# Patient Record
Sex: MALE | Race: WHITE | HISPANIC OR LATINO | Employment: UNEMPLOYED | URBAN - METROPOLITAN AREA
[De-identification: names, ages, dates, MRNs, and addresses within clinical notes are randomized per-mention and may not be internally consistent; named-entity substitution may affect disease eponyms.]

---

## 2020-04-25 ENCOUNTER — HOSPITAL ENCOUNTER (EMERGENCY)
Facility: MEDICAL CENTER | Age: 50
End: 2020-04-26
Attending: EMERGENCY MEDICINE
Payer: MEDICAID

## 2020-04-25 ENCOUNTER — APPOINTMENT (OUTPATIENT)
Dept: RADIOLOGY | Facility: MEDICAL CENTER | Age: 50
End: 2020-04-25
Attending: EMERGENCY MEDICINE
Payer: MEDICAID

## 2020-04-25 DIAGNOSIS — M54.16 BILATERAL LUMBAR RADICULOPATHY: ICD-10-CM

## 2020-04-25 PROCEDURE — 96372 THER/PROPH/DIAG INJ SC/IM: CPT

## 2020-04-25 PROCEDURE — 700111 HCHG RX REV CODE 636 W/ 250 OVERRIDE (IP): Performed by: EMERGENCY MEDICINE

## 2020-04-25 PROCEDURE — 700102 HCHG RX REV CODE 250 W/ 637 OVERRIDE(OP): Performed by: EMERGENCY MEDICINE

## 2020-04-25 PROCEDURE — A9270 NON-COVERED ITEM OR SERVICE: HCPCS | Performed by: EMERGENCY MEDICINE

## 2020-04-25 PROCEDURE — 99284 EMERGENCY DEPT VISIT MOD MDM: CPT

## 2020-04-25 PROCEDURE — 72131 CT LUMBAR SPINE W/O DYE: CPT

## 2020-04-25 RX ORDER — KETOROLAC TROMETHAMINE 30 MG/ML
30 INJECTION, SOLUTION INTRAMUSCULAR; INTRAVENOUS ONCE
Status: COMPLETED | OUTPATIENT
Start: 2020-04-25 | End: 2020-04-25

## 2020-04-25 RX ORDER — METHYLPREDNISOLONE 4 MG/1
TABLET ORAL
Qty: 21 TAB | Refills: 0 | Status: SHIPPED | OUTPATIENT
Start: 2020-04-25

## 2020-04-25 RX ORDER — HYDROCODONE BITARTRATE AND ACETAMINOPHEN 5; 325 MG/1; MG/1
1 TABLET ORAL EVERY 4 HOURS PRN
Qty: 20 TAB | Refills: 0 | Status: SHIPPED | OUTPATIENT
Start: 2020-04-25 | End: 2020-04-30

## 2020-04-25 RX ORDER — CYCLOBENZAPRINE HCL 10 MG
10 TABLET ORAL ONCE
Status: COMPLETED | OUTPATIENT
Start: 2020-04-25 | End: 2020-04-25

## 2020-04-25 RX ORDER — HYDROCODONE BITARTRATE AND ACETAMINOPHEN 5; 325 MG/1; MG/1
1 TABLET ORAL ONCE
Status: COMPLETED | OUTPATIENT
Start: 2020-04-26 | End: 2020-04-26

## 2020-04-25 RX ADMIN — CYCLOBENZAPRINE HYDROCHLORIDE 10 MG: 10 TABLET, FILM COATED ORAL at 22:41

## 2020-04-25 RX ADMIN — KETOROLAC TROMETHAMINE 30 MG: 30 INJECTION, SOLUTION INTRAMUSCULAR at 22:41

## 2020-04-25 SDOH — HEALTH STABILITY: MENTAL HEALTH: HOW OFTEN DO YOU HAVE 6 OR MORE DRINKS ON ONE OCCASION?: DAILY OR ALMOST DAILY

## 2020-04-25 SDOH — HEALTH STABILITY: MENTAL HEALTH: HOW MANY STANDARD DRINKS CONTAINING ALCOHOL DO YOU HAVE ON A TYPICAL DAY?: 5 OR 6

## 2020-04-25 SDOH — HEALTH STABILITY: MENTAL HEALTH: HOW OFTEN DO YOU HAVE A DRINK CONTAINING ALCOHOL?: 4 OR MORE TIMES A WEEK

## 2020-04-26 VITALS
HEIGHT: 68 IN | OXYGEN SATURATION: 95 % | RESPIRATION RATE: 18 BRPM | HEART RATE: 95 BPM | DIASTOLIC BLOOD PRESSURE: 71 MMHG | TEMPERATURE: 98.7 F | SYSTOLIC BLOOD PRESSURE: 104 MMHG

## 2020-04-26 PROCEDURE — 700102 HCHG RX REV CODE 250 W/ 637 OVERRIDE(OP): Performed by: EMERGENCY MEDICINE

## 2020-04-26 PROCEDURE — A9270 NON-COVERED ITEM OR SERVICE: HCPCS | Performed by: EMERGENCY MEDICINE

## 2020-04-26 RX ADMIN — HYDROCODONE BITARTRATE AND ACETAMINOPHEN 1 TABLET: 5; 325 TABLET ORAL at 00:00

## 2020-04-26 NOTE — ED NOTES
Patient discharged to home, verbalized understanding.  Ambulated without difficulty.   Prescriptions given.

## 2020-04-26 NOTE — ED PROVIDER NOTES
"ED Provider Note    ED Provider Note    Scribed for Hunter Reid MD by Hunter Reid M.D.. 4/25/2020, 10:27 PM.    Primary care provider: No primary care provider on file.  Means of arrival: Private  History obtained from: Patient  History limited by: None    CHIEF COMPLAINT  Chief Complaint   Patient presents with   • Back Pain     sciatica for 8 months, worse for last 2 weeks. Denies injury.       FRANCY Wen is a 49 y.o. male who presents to the Emergency Department for evaluation of pain in the back.  Patient relates started about 8 months ago began having pain at the bilateral low back radiating down the right leg.  Notes while he was in [place] in December he received \"some shots of my back\" into her [facility] helped.  No center for last 2 weeks and have recurred now also involve the left lower extremity.  Pain radiates from the bilateral lower back down the bilateral thighs and below the knee on the left.  Notes a chronic tingling sensation in the bottom of his feet but no new numbness nor weakness.  He is able to ambulate and has no bowel or bladder dysfunction.  He does note ambulating standing worsens the pain, improves somewhat when he is lying down.  He is not currently take any medications but does consider giving a month of some form prescription in [place] as he has run out of since January.  No fever, no vomiting, he is nondiabetic and is not anticoagulated.    REVIEW OF SYSTEMS  Pertinent positives include bilateral low back pain worse with standing and ambulating radiating down the bilateral lower extremities. Pertinent negatives include no acute numbness nor weakness, no inability to ambulate, no bowel or bladder dysfunction, no fever, not anticoagulated.  All other systems reviewed and negative.    PAST MEDICAL HISTORY   has a past medical history of Sciatica.    SURGICAL HISTORY  patient denies any surgical history    SOCIAL HISTORY  Social History     Tobacco Use   • " "Smoking status: Current Every Day Smoker     Packs/day: 0.50     Types: Cigarettes   • Smokeless tobacco: Never Used   Substance Use Topics   • Alcohol use: Yes     Alcohol/week: 25.2 oz     Types: 42 Cans of beer per week     Frequency: 4 or more times a week     Drinks per session: 5 or 6     Binge frequency: Daily or almost daily   • Drug use: Never      Social History     Substance and Sexual Activity   Drug Use Never       FAMILY HISTORY  History reviewed. No pertinent family history.    CURRENT MEDICATIONS  Home Medications     Reviewed by Memo De Paz R.N. (Registered Nurse) on 04/25/20 at 2214  Med List Status: Complete   Medication Last Dose Status        Patient Morris Taking any Medications                       ALLERGIES  No Known Allergies    PHYSICAL EXAM  VITAL SIGNS: /71   Pulse 95   Temp 37.1 °C (98.7 °F) (Temporal)   Resp 18   Ht 1.727 m (5' 8\")   SpO2 95%     General: Alert, no acute distress  Skin: Warm, dry, normal for ethnicity  Head: Normocephalic, atraumatic  Cardiovascular: Regular rate and rhythm, No murmur, Normal peripheral perfusion  Respiratory: Lungs CTA, respirations are non-labored, breath sounds are equal  Gastrointestinal: Soft, nontender, non distended  Musculoskeletal: No swelling, no deformity.  There is bilateral midline and paraspinous tenderness in the upper abdomen lumbar spine.  Bilateral sacroiliac joint tenderness, no step-off.  Positive straight leg raise on the left.  No femoral anesthesia.  Neurological: Alert and oriented to person, place, time, and situation.  5 x 5 dorsal plantarflexion of the feet feet symmetrical bilaterally, intact flexion extension of the hips both active and passive.  Sensation to light touch symmetrical bilaterally, 2+ patellar reflexes.  Stands and ambulates at bedside well.  Psychiatric: Cooperative, appropriate mood & affect          RADIOLOGY  CT-LSPINE W/O PLUS RECONS   Final Result      1.  No acute osseous abnormality. " "  2.  Degenerative changes as detailed.   3.  Hepatic steatosis.        The radiologist's interpretation of all radiological studies have been reviewed by me.    COURSE & MEDICAL DECISION MAKING  Pertinent Labs & Imaging studies reviewed. (See chart for details)    10:27 PM - Patient seen and examined at bedside. Patient will be treated with ketorolac 3 mg IM and Flexeril 10 mg. Ordered CT imaging of the lumbar spine to evaluate his symptoms. The differential diagnoses include but are not limited to: Lumbar radiculopathy, compression fracture, sciatica    2347: Patient reassessed, he is already feeling much better after ketorolac.  Ordered a dose of Norco for him.  He can be reasonable to follow-up in the outpatient setting given the chronicity of his pain given failure of conservative management, NSAIDs here I will write him for a brief course of [place].    Patient Vitals for the past 24 hrs:   BP Temp Temp src Pulse Resp SpO2 Height   04/25/20 2346 104/71 -- -- 95 -- 95 % --   04/25/20 2212 142/95 37.1 °C (98.7 °F) Temporal 100 18 95 % --   04/25/20 2209 -- -- -- -- -- -- 1.727 m (5' 8\")   04/25/20 2202 140/92 -- -- -- -- -- --     HTN/IDDM FOLLOW UP:  The patient is referred to a primary physician for blood pressure management, diabetic screening, and for all other preventive health concerns    Decision Making:  This is a 49 y.o. year old male who presents with bilateral low back pain rating down both legs.  He has symptoms chronically but also worse with activity left lower extremity as well.  No fever, no bowel or bladder dysfunction, no acute numbness or weakness on exam no footdrop no saddle anesthesia.  As such there is no indication for emergent MRI nor neurosurgical consultation, this would not be consistent with cauda equina syndrome.  Given age more than 49 worsening symptoms with CT imaging is indicated to rule out new compression abnormality.  I reviewed prescription monitoring program for patient's " narcotic use before prescribing a scheduled drug.The patient will not drink alcohol nor drive with prescribed medications      In prescribing controlled substances to this patient, I certify that I have obtained and reviewed the medical history this patient I have also made a good rosalia effort to obtain applicable records from other providers who have treated the patient and records did not demonstrate any increased risk of substance abuse that would prevent me from prescribing controlled substances.     I have conducted a physical exam and documented it. I have reviewed Mr. Wen’s prescription history as maintained by the [place] Prescription Monitoring Program.     I have assessed the patient’s risk for abuse, dependency, and addiction using the validated Opioid Risk Tool available at https://www.mdcalc.com/odjdmh-tcac-eweq-ort-narcotic-abuse.     Given the above, I believe the benefits of controlled substance therapy outweigh the risks. The reasons for prescribing controlled substances include in my professional opinion, controlled substances are a reasonable choice for this patient. Accordingly, I have discussed the risk and benefits, treatment plan, and alternative therapies with the patient. The patient has been consented for the medication and understands the risks.      I reviewed prescription monitoring program for patient's narcotic use before prescribing a scheduled drug.The patient will not drink alcohol nor drive with prescribed medications. The patient will return for new or worsening symptoms and is stable at the time of discharge.    Patient has had high blood pressure while in the emergency department, felt likely secondary to medical condition. Counseled patient to monitor blood pressure at home and follow up with primary care physician.    DISPOSITION:  Patient will be discharged home in stable condition.    FOLLOW UP:  Khanh Araya M.D.  [address] [place] [address]  [id number]  [phone]    Schedule an appointment as soon as possible for a visit       Regino Bah M.D.  [address] [place] [id number] [phone]            OUTPATIENT MEDICATIONS:  Discharge Medication List as of 4/25/2020 11:51 PM      START taking these medications    Details   methylPREDNISolone (MEDROL DOSEPAK) 4 MG Tablet Therapy Pack Follow schedule on package instructions., Disp-21 Tab, R-0, Print Rx Paper      HYDROcodone-acetaminophen (NORCO) 5-325 MG Tab per tablet Take 1 Tab by mouth every four hours as needed (pain) for up to 5 days., Disp-20 Tab, R-0, Print Rx Paper               FINAL IMPRESSION  1. Bilateral lumbar radiculopathy          Hunter SPICER M.D. (Scribe), am scribing for, and in the presence of, Hunter Reid MD.    Electronically signed by: Hunter Reid M.D. (Scribe), 4/25/2020    IHunter MD personally performed the services described in this documentation, as scribed by Hunter Reid M.D. in my presence, and it is both accurate and complete    The note accurately reflects work and decisions made by me.  Hunter Reid M.D.  4/25/2020  10:32 PM

## 2020-04-26 NOTE — DISCHARGE INSTRUCTIONS
CAT scan shows left-sided arthritis, pain medications to help with pain for your sciatica, do recommend follow-up with primary care and if needed specialist which is a neurosurgeon to evaluate you further for your sciatica.

## 2020-04-26 NOTE — ED TRIAGE NOTES
"Chief Complaint   Patient presents with   • Back Pain     sciatica for 8 months, worse for last 2 weeks. Denies injury.     ED Triage Vitals   Enc Vitals Group      Blood Pressure 04/25/20 2202 140/92      Pulse 04/25/20 2212 100      Respiration 04/25/20 2212 18      Temperature 04/25/20 2212 37.1 °C (98.7 °F)      Temp src 04/25/20 2212 Temporal      Pulse Oximetry 04/25/20 2212 95 %      Weight --       Height 04/25/20 2209 1.727 m (5' 8\")     C/o lower back pain that radiates down left leg. States he has sciatica. Denies back surgeries. States he had back injections in [place] in December. Denies recent injury. States pain is too bad to work for the past 2 weeks. Patient works as a \"\".  "

## 2020-05-05 ENCOUNTER — HOSPITAL ENCOUNTER (EMERGENCY)
Facility: MEDICAL CENTER | Age: 50
End: 2020-05-05
Attending: EMERGENCY MEDICINE
Payer: MEDICAID

## 2020-05-05 VITALS
WEIGHT: 151.9 LBS | TEMPERATURE: 98.6 F | SYSTOLIC BLOOD PRESSURE: 154 MMHG | RESPIRATION RATE: 16 BRPM | BODY MASS INDEX: 24.41 KG/M2 | HEART RATE: 94 BPM | HEIGHT: 66 IN | OXYGEN SATURATION: 98 % | DIASTOLIC BLOOD PRESSURE: 94 MMHG

## 2020-05-05 DIAGNOSIS — Z72.0 TOBACCO USE: ICD-10-CM

## 2020-05-05 DIAGNOSIS — M48.062 SPINAL STENOSIS OF LUMBAR REGION WITH NEUROGENIC CLAUDICATION: ICD-10-CM

## 2020-05-05 DIAGNOSIS — M54.50 LUMBAR BACK PAIN: ICD-10-CM

## 2020-05-05 DIAGNOSIS — M51.26 HNP (HERNIATED NUCLEUS PULPOSUS), LUMBAR: ICD-10-CM

## 2020-05-05 PROCEDURE — 700111 HCHG RX REV CODE 636 W/ 250 OVERRIDE (IP): Performed by: EMERGENCY MEDICINE

## 2020-05-05 PROCEDURE — 96372 THER/PROPH/DIAG INJ SC/IM: CPT

## 2020-05-05 PROCEDURE — A9270 NON-COVERED ITEM OR SERVICE: HCPCS | Performed by: EMERGENCY MEDICINE

## 2020-05-05 PROCEDURE — 99283 EMERGENCY DEPT VISIT LOW MDM: CPT

## 2020-05-05 PROCEDURE — 700102 HCHG RX REV CODE 250 W/ 637 OVERRIDE(OP): Performed by: EMERGENCY MEDICINE

## 2020-05-05 RX ORDER — GABAPENTIN 300 MG/1
300 CAPSULE ORAL 3 TIMES DAILY
Qty: 90 CAP | Refills: 0 | Status: SHIPPED | OUTPATIENT
Start: 2020-05-05

## 2020-05-05 RX ORDER — MELOXICAM 15 MG/1
15 TABLET ORAL DAILY
Qty: 30 TAB | Refills: 0 | Status: SHIPPED | OUTPATIENT
Start: 2020-05-05

## 2020-05-05 RX ORDER — KETOROLAC TROMETHAMINE 30 MG/ML
30 INJECTION, SOLUTION INTRAMUSCULAR; INTRAVENOUS ONCE
Status: COMPLETED | OUTPATIENT
Start: 2020-05-05 | End: 2020-05-05

## 2020-05-05 RX ORDER — PREDNISONE 50 MG/1
50 TABLET ORAL DAILY
Qty: 5 TAB | Refills: 0 | Status: SHIPPED | OUTPATIENT
Start: 2020-05-05 | End: 2020-05-10

## 2020-05-05 RX ORDER — METHOCARBAMOL 500 MG/1
1000 TABLET, FILM COATED ORAL ONCE
Status: COMPLETED | OUTPATIENT
Start: 2020-05-05 | End: 2020-05-05

## 2020-05-05 RX ADMIN — KETOROLAC TROMETHAMINE 30 MG: 30 INJECTION, SOLUTION INTRAMUSCULAR at 08:45

## 2020-05-05 RX ADMIN — METHOCARBAMOL TABLETS 1000 MG: 500 TABLET, COATED ORAL at 08:45

## 2020-05-05 ASSESSMENT — PAIN SCALES - WONG BAKER: WONGBAKER_NUMERICALRESPONSE: HURTS A WHOLE LOT

## 2020-05-05 NOTE — ED PROVIDER NOTES
ED Provider Note    CHIEF COMPLAINT  Chief Complaint   Patient presents with   • Back Pain     Months of back pain, he is a  by trade. Reports being seen here for this same complaint 1 week ago where he was given a shot, continues to have a lot of pain associated with sciatic pain down left leg.     Seen at 8:23 AM    HPI  Oumar Wen is a 49 y.o. male who presents low back pain.  The patient notes months of low back pain.  He was initially evaluated in [place] where he received an injection in his back.  This was in December.  He notes following the injection he had resolution of his symptoms for about 10 days.  He feels fairly constant lower back pain that radiates down the bilateral lower legs.  Initially started as the right lower leg, now he feels it is more in his left lower leg.  When he stands for a few minutes at a time he gets some tingling sensation in the lower extremities.  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  He does not have insurance and has not seen any other clinician aside from the emergency department here.    He denies any fevers, chills, incontinence, dysuria, hesitancy, weakness.  He does smoke cigarettes.    All history was obtained via the translation tablet.    REVIEW OF SYSTEMS  See HPI  PAST MEDICAL HISTORY   has a past medical history of Sciatica.    SOCIAL HISTORY  Social History     Tobacco Use   • Smoking status: Current Every Day Smoker     Packs/day: 0.50     Types: Cigarettes   • Smokeless tobacco: Never Used   Substance and Sexual Activity   • Alcohol use: Yes     Alcohol/week: 25.2 oz     Types: 42 Cans of beer per week     Frequency: 4 or more times a week     Drinks per session: 5 or 6     Binge frequency: Daily or almost daily   • Drug use: Never   • Sexual activity: Not on file       SURGICAL HISTORY  patient denies any surgical history    CURRENT MEDICATIONS  Reviewed.  See Encounter  "Summary.     ALLERGIES  No Known Allergies    PHYSICAL EXAM  VITAL SIGNS: /94   Pulse 94   Temp 37 °C (98.6 °F) (Temporal)   Resp 16   Ht 1.676 m (5' 6\")   Wt 68.9 kg (151 lb 14.4 oz)   SpO2 98%   BMI 24.52 kg/m²   Constitutional: Awake, alert in no apparent distress.  HENT: Normocephalic, Bilateral external ears normal. Nose normal.   Eyes: Conjunctiva normal, non-icteric, EOMI.    Thorax & Lungs: Easy unlabored respirations  Cardiovascular:    Abdomen:  No distention  Skin: Visualized skin is  Dry, No erythema, No rash.   Musculoskeletal: Back is normal in appearance without any midline tenderness or step-off.  The overlying skin is normal.  The patient notes the pain is quite low and points to his paraspinal L4 region.  Neurologic: Alert, Grossly non-focal.  No saddle paresthesias, 5 out of 5 dorsiflexion and plantarflexion.  Normal stance and gait.  Psychiatric: Affect and Mood normal    RADIOLOGY  No orders to display       Nursing notes and vital signs were reviewed. (See chart for details)    Decision Making:  This is a 49 y.o. year old male who presents with subacute to chronic low back pain with sciatica type symptoms.  I reviewed the CT from his last visit here about 10 days ago.  He does not have any sign of malignancy.  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  There is also some moderate stenosis.    The patient's symptoms of tingling when standing is consistent with spinal stenosis.  Sciatica is likely due to either the stenosis or the foraminal narrowing.  I did  him extensively on smoking cessation as this can improve his quality of life, I will also decrease surgical complication risk.  His follow-up may be difficult given that he is Amharic only speaking and uninsured.  I have given him the number for [facility] as well as [facility].  Hopefully they will evaluate the patient.    He did receive opioids on his last presentation, this is not going " to be a sustainable treatment option.  I will place him on Neurontin for the radicular symptoms, Mobic for the low-dose anti-inflammatory and pain as well as a short course of steroids.    DISPOSITION:  Patient will be discharged home in good condition.    Discharge Medications:  New Prescriptions    GABAPENTIN (NEURONTIN) 300 MG CAP    Take 1 Cap by mouth 3 times a day.    MELOXICAM (MOBIC) 15 MG TABLET    Take 1 Tab by mouth every day.    PREDNISONE (DELTASONE) 50 MG TAB    Take 1 Tab by mouth every day for 5 days.       The patient was discharged home (see d/c instructions) and told to return immediately for any signs or symptoms listed, or any worsening at all.  The patient verbally agreed to the discharge precautions and follow-up plan which is documented in EPIC.    The patient's blood pressure is elevated today. >120/80. I have referred them to primary care for follow up.       FINAL IMPRESSION  1. HNP (herniated nucleus pulposus), lumbar    2. Spinal stenosis of lumbar region with neurogenic claudication    3. Lumbar back pain    4. Tobacco use

## 2020-05-05 NOTE — ED NOTES
ERP at bedside. Pt agrees with plan of care discussed by ERP. AIDET acknowledged with patient. Medicinal interventions carried out per ERP orders. [place] in low position, side rail up for pt safety. Call light within reach. Will continue to monitor.

## 2020-05-05 NOTE — ED TRIAGE NOTES
Chief Complaint   Patient presents with   • Back Pain     Months of back pain, he is a  by [employer]. Reports being seen here for this same complaint 1 week ago where he was given a shot, continues to have a lot of pain associated with sciatic pain down left leg.

## 2020-05-05 NOTE — ED NOTES
Discharge instructions provided.  Pt verbalized the understanding of discharge instructions to follow up with PCP and to return to ER if condition worsens.  Pt ambulated out of ER without difficulty. RX  3

## 2022-03-14 ENCOUNTER — APPOINTMENT (RX ONLY)
Dept: URBAN - METROPOLITAN AREA CLINIC 147 | Facility: CLINIC | Age: 52
Setting detail: DERMATOLOGY
End: 2022-03-14

## 2022-03-14 DIAGNOSIS — D22 MELANOCYTIC NEVI: ICD-10-CM

## 2022-03-14 DIAGNOSIS — L82.1 OTHER SEBORRHEIC KERATOSIS: ICD-10-CM

## 2022-03-14 DIAGNOSIS — L738 OTHER SPECIFIED DISEASES OF HAIR AND HAIR FOLLICLES: ICD-10-CM | Status: INADEQUATELY CONTROLLED

## 2022-03-14 DIAGNOSIS — L81.4 OTHER MELANIN HYPERPIGMENTATION: ICD-10-CM

## 2022-03-14 DIAGNOSIS — L663 OTHER SPECIFIED DISEASES OF HAIR AND HAIR FOLLICLES: ICD-10-CM | Status: INADEQUATELY CONTROLLED

## 2022-03-14 DIAGNOSIS — L57.0 ACTINIC KERATOSIS: ICD-10-CM

## 2022-03-14 DIAGNOSIS — L73.9 FOLLICULAR DISORDER, UNSPECIFIED: ICD-10-CM | Status: INADEQUATELY CONTROLLED

## 2022-03-14 DIAGNOSIS — Z71.89 OTHER SPECIFIED COUNSELING: ICD-10-CM

## 2022-03-14 PROBLEM — D22.39 MELANOCYTIC NEVI OF OTHER PARTS OF FACE: Status: ACTIVE | Noted: 2022-03-14

## 2022-03-14 PROBLEM — L02.223 FURUNCLE OF CHEST WALL: Status: ACTIVE | Noted: 2022-03-14

## 2022-03-14 PROBLEM — D48.5 NEOPLASM OF UNCERTAIN BEHAVIOR OF SKIN: Status: ACTIVE | Noted: 2022-03-14

## 2022-03-14 PROCEDURE — 99204 OFFICE O/P NEW MOD 45 MIN: CPT | Mod: 25

## 2022-03-14 PROCEDURE — ? PRESCRIPTION

## 2022-03-14 PROCEDURE — ? SUNSCREEN RECOMMENDATIONS

## 2022-03-14 PROCEDURE — 11103 TANGNTL BX SKIN EA SEP/ADDL: CPT

## 2022-03-14 PROCEDURE — ? BIOPSY BY SHAVE METHOD

## 2022-03-14 PROCEDURE — 11102 TANGNTL BX SKIN SINGLE LES: CPT

## 2022-03-14 PROCEDURE — ? COUNSELING

## 2022-03-14 RX ORDER — BENZOYL PEROXIDE 10 G/100G
SUSPENSION TOPICAL
Qty: 1 | Refills: 2 | Status: ERX | COMMUNITY
Start: 2022-03-14

## 2022-03-14 RX ORDER — CLINDAMYCIN PHOSPHATE 10 MG/G
GEL TOPICAL
Qty: 60 | Refills: 3 | Status: ERX | COMMUNITY
Start: 2022-03-14

## 2022-03-14 RX ADMIN — BENZOYL PEROXIDE: 10 SUSPENSION TOPICAL at 00:00

## 2022-03-14 RX ADMIN — CLINDAMYCIN PHOSPHATE: 10 GEL TOPICAL at 00:00

## 2022-03-14 ASSESSMENT — LOCATION DETAILED DESCRIPTION DERM
LOCATION DETAILED: LEFT CENTRAL TEMPLE
LOCATION DETAILED: LEFT CLAVICULAR SKIN
LOCATION DETAILED: LEFT LATERAL FOREHEAD
LOCATION DETAILED: LEFT MEDIAL ZYGOMA
LOCATION DETAILED: RIGHT FOREHEAD
LOCATION DETAILED: LEFT CENTRAL MALAR CHEEK
LOCATION DETAILED: RIGHT CENTRAL MALAR CHEEK
LOCATION DETAILED: LEFT CENTRAL EYEBROW

## 2022-03-14 ASSESSMENT — LOCATION SIMPLE DESCRIPTION DERM
LOCATION SIMPLE: RIGHT FOREHEAD
LOCATION SIMPLE: LEFT TEMPLE
LOCATION SIMPLE: RIGHT CHEEK
LOCATION SIMPLE: LEFT CLAVICULAR SKIN
LOCATION SIMPLE: LEFT FOREHEAD
LOCATION SIMPLE: LEFT ZYGOMA
LOCATION SIMPLE: LEFT EYEBROW
LOCATION SIMPLE: LEFT CHEEK

## 2022-03-14 ASSESSMENT — LOCATION ZONE DERM
LOCATION ZONE: TRUNK
LOCATION ZONE: FACE

## 2022-03-14 NOTE — PROCEDURE: BIOPSY BY SHAVE METHOD
Anesthesia Type: 1% lidocaine with epinephrine and a 1:10 solution of 8.4% sodium bicarbonate
Anesthesia Volume In Cc: 0.5
Validate Note Data (See Information Below): Yes
Hide Topical Anesthesia?: No
Silver Nitrate Text: The wound bed was treated with silver nitrate after the biopsy was performed.
Information: Selecting Yes will display possible errors in your note based on the variables you have selected. This validation is only offered as a suggestion for you. PLEASE NOTE THAT THE VALIDATION TEXT WILL BE REMOVED WHEN YOU FINALIZE YOUR NOTE. IF YOU WANT TO FAX A PRELIMINARY NOTE YOU WILL NEED TO TOGGLE THIS TO 'NO' IF YOU DO NOT WANT IT IN YOUR FAXED NOTE.
Hemostasis: Electrocautery
Cryotherapy Text: The wound bed was treated with cryotherapy after the biopsy was performed.
Depth Of Biopsy: dermis
Billing Type: Third-Party Bill
Wound Care: Vaseline
Notification Instructions: Patient will be notified of biopsy results. However, patient instructed to call the office if not contacted within 2 weeks.
Post-Care Instructions: I reviewed with the patient in detail post-care instructions. Patient is to keep the biopsy site dry overnight, and then apply bacitracin twice daily until healed. Patient may apply hydrogen peroxide soaks to remove any crusting.
Electrodesiccation Text: The wound bed was treated with electrodesiccation after the biopsy was performed.
Additional Anesthesia Volume In Cc (Will Not Render If 0): 0
Type Of Destruction Used: Curettage
Biopsy Type: H and E
Consent: Written consent was obtained and risks were reviewed including but not limited to scarring, infection, bleeding, scabbing, incomplete removal, nerve damage and allergy to anesthesia.
Curettage Text: The wound bed was treated with curettage after the biopsy was performed.
Dressing: bandage
Detail Level: Detailed
Biopsy Method: Dermablade
Electrodesiccation And Curettage Text: The wound bed was treated with electrodesiccation and curettage after the biopsy was performed.